# Patient Record
Sex: MALE | Race: WHITE | NOT HISPANIC OR LATINO | Employment: OTHER | URBAN - METROPOLITAN AREA
[De-identification: names, ages, dates, MRNs, and addresses within clinical notes are randomized per-mention and may not be internally consistent; named-entity substitution may affect disease eponyms.]

---

## 2024-03-25 DIAGNOSIS — R26.89 POOR BALANCE: Primary | ICD-10-CM

## 2024-05-14 ENCOUNTER — LAB VISIT (OUTPATIENT)
Dept: LAB | Facility: HOSPITAL | Age: 69
End: 2024-05-14
Attending: PSYCHIATRY & NEUROLOGY
Payer: MEDICARE

## 2024-05-14 ENCOUNTER — OFFICE VISIT (OUTPATIENT)
Dept: NEUROLOGY | Facility: CLINIC | Age: 69
End: 2024-05-14
Payer: MEDICARE

## 2024-05-14 ENCOUNTER — TELEPHONE (OUTPATIENT)
Dept: NEUROLOGY | Facility: CLINIC | Age: 69
End: 2024-05-14

## 2024-05-14 VITALS
WEIGHT: 227.81 LBS | DIASTOLIC BLOOD PRESSURE: 82 MMHG | SYSTOLIC BLOOD PRESSURE: 130 MMHG | HEIGHT: 75 IN | BODY MASS INDEX: 28.32 KG/M2 | HEART RATE: 62 BPM

## 2024-05-14 DIAGNOSIS — M54.9 DORSALGIA, UNSPECIFIED: ICD-10-CM

## 2024-05-14 DIAGNOSIS — R42 DIZZINESS AND GIDDINESS: ICD-10-CM

## 2024-05-14 DIAGNOSIS — R79.9 ABNORMAL FINDING OF BLOOD CHEMISTRY, UNSPECIFIED: ICD-10-CM

## 2024-05-14 DIAGNOSIS — R26.89 POOR BALANCE: Primary | ICD-10-CM

## 2024-05-14 DIAGNOSIS — I67.89 OTHER CEREBROVASCULAR DISEASE: ICD-10-CM

## 2024-05-14 DIAGNOSIS — G60.9 HEREDITARY AND IDIOPATHIC NEUROPATHY, UNSPECIFIED: ICD-10-CM

## 2024-05-14 DIAGNOSIS — G45.1 HEMISPHERIC CAROTID ARTERY SYNDROME: ICD-10-CM

## 2024-05-14 DIAGNOSIS — R26.89 POOR BALANCE: ICD-10-CM

## 2024-05-14 LAB
ALBUMIN SERPL-MCNC: 4 G/DL (ref 3.4–4.8)
ALBUMIN/GLOB SERPL: 1.5 RATIO (ref 1.1–2)
ALP SERPL-CCNC: 62 UNIT/L (ref 40–150)
ALT SERPL-CCNC: 26 UNIT/L (ref 0–55)
AST SERPL-CCNC: 15 UNIT/L (ref 5–34)
BILIRUB SERPL-MCNC: 0.5 MG/DL
BUN SERPL-MCNC: 21.4 MG/DL (ref 8.4–25.7)
CALCIUM SERPL-MCNC: 9.4 MG/DL (ref 8.8–10)
CHLORIDE SERPL-SCNC: 106 MMOL/L (ref 98–107)
CO2 SERPL-SCNC: 29 MMOL/L (ref 23–31)
CREAT SERPL-MCNC: 1.51 MG/DL (ref 0.73–1.18)
EST. AVERAGE GLUCOSE BLD GHB EST-MCNC: 111.2 MG/DL
GFR SERPLBLD CREATININE-BSD FMLA CKD-EPI: 51 ML/MIN/1.73/M2
GLOBULIN SER-MCNC: 2.6 GM/DL (ref 2.4–3.5)
GLUCOSE SERPL-MCNC: 107 MG/DL (ref 82–115)
HBA1C MFR BLD: 5.5 %
IGA SERPL-MCNC: 154 MG/DL (ref 101–645)
IGG SERPL-MCNC: 730 MG/DL (ref 540–1822)
IGM SERPL-MCNC: 66 MG/DL (ref 22–240)
POTASSIUM SERPL-SCNC: 4.6 MMOL/L (ref 3.5–5.1)
PROT SERPL-MCNC: 6.6 GM/DL (ref 5.8–7.6)
SODIUM SERPL-SCNC: 143 MMOL/L (ref 136–145)
TSH SERPL-ACNC: 0.89 UIU/ML (ref 0.35–4.94)
VIT B12 SERPL-MCNC: 341 PG/ML (ref 213–816)

## 2024-05-14 PROCEDURE — 80053 COMPREHEN METABOLIC PANEL: CPT

## 2024-05-14 PROCEDURE — 83036 HEMOGLOBIN GLYCOSYLATED A1C: CPT

## 2024-05-14 PROCEDURE — 82607 VITAMIN B-12: CPT

## 2024-05-14 PROCEDURE — 84443 ASSAY THYROID STIM HORMONE: CPT

## 2024-05-14 PROCEDURE — 86366 MUSCLE-SPECIFIC KINASE ANTB: CPT

## 2024-05-14 PROCEDURE — 36415 COLL VENOUS BLD VENIPUNCTURE: CPT

## 2024-05-14 PROCEDURE — 99205 OFFICE O/P NEW HI 60 MIN: CPT | Mod: S$PBB,,, | Performed by: PSYCHIATRY & NEUROLOGY

## 2024-05-14 PROCEDURE — 84165 PROTEIN E-PHORESIS SERUM: CPT

## 2024-05-14 PROCEDURE — 83519 RIA NONANTIBODY: CPT

## 2024-05-14 PROCEDURE — 99215 OFFICE O/P EST HI 40 MIN: CPT | Mod: PBBFAC | Performed by: PSYCHIATRY & NEUROLOGY

## 2024-05-14 PROCEDURE — 83521 IG LIGHT CHAINS FREE EACH: CPT

## 2024-05-14 PROCEDURE — 82784 ASSAY IGA/IGD/IGG/IGM EACH: CPT

## 2024-05-14 PROCEDURE — 99999 PR PBB SHADOW E&M-EST. PATIENT-LVL V: CPT | Mod: PBBFAC,,, | Performed by: PSYCHIATRY & NEUROLOGY

## 2024-05-14 RX ORDER — BUPROPION HYDROCHLORIDE 300 MG/1
300 TABLET ORAL DAILY
COMMUNITY

## 2024-05-14 RX ORDER — ERGOCALCIFEROL 1.25 MG/1
50000 CAPSULE ORAL
COMMUNITY
Start: 2024-04-10

## 2024-05-14 RX ORDER — FAMOTIDINE 40 MG/1
40 TABLET, FILM COATED ORAL NIGHTLY PRN
COMMUNITY

## 2024-05-14 RX ORDER — VERAPAMIL HYDROCHLORIDE 240 MG/1
240 TABLET, FILM COATED, EXTENDED RELEASE ORAL DAILY
COMMUNITY

## 2024-05-14 RX ORDER — METOPROLOL SUCCINATE 50 MG/1
1 TABLET, EXTENDED RELEASE ORAL DAILY
COMMUNITY

## 2024-05-14 RX ORDER — PANTOPRAZOLE SODIUM 40 MG/1
40 TABLET, DELAYED RELEASE ORAL DAILY
COMMUNITY

## 2024-05-14 RX ORDER — TRAZODONE HYDROCHLORIDE 150 MG/1
150 TABLET ORAL NIGHTLY
COMMUNITY

## 2024-05-14 RX ORDER — FENOFIBRATE 54 MG/1
1 TABLET ORAL DAILY
COMMUNITY

## 2024-05-14 RX ORDER — GABAPENTIN 600 MG/1
600 TABLET ORAL DAILY
COMMUNITY

## 2024-05-14 RX ORDER — LOVASTATIN 20 MG/1
20 TABLET ORAL NIGHTLY
COMMUNITY
End: 2024-06-03

## 2024-05-14 NOTE — TELEPHONE ENCOUNTER
----- Message from Saritha Tejada MD sent at 5/14/2024 11:21 AM CDT -----  Based on his current creatinine he cannot get the CTA we discussed, I will change to MRA

## 2024-05-14 NOTE — PROGRESS NOTES
Chief Complaint   Patient presents with    Balance     NP: Pt referred by Dr. Sukhjinder Medina for neuro consult to evaluate for balance; Pt states balance difficulty 3/2024 weakness to right leg had labs drawn by PCP showing some dehydration denies episode since         This is a 68 y.o. male  here for episodic right lower extremity weakness.  Patient reports symptoms occurred in March.  He recalls having acute onset of right lower extremity weakness that lasted for about 10 hours and then went away.  He had difficulty walking.  The episode occurred again a few days later while shopping at the store.  Had to hold on to a grocery cart while he was walking.  Again only lasted for a few hours and then went away.  He denied any weakness of his arms or drooping of his face or slurring of his words.  Denies any numbness.  Only weakness.  He has had intermittent lower back pain with sciatica on the left side in the past but no severe pain.  He denies any diplopia, dysarthria, dysphagia.  Denies headaches.  Denies vision changes.  Denies loss of consciousness or impairment of consciousness.  Did have a workup including labs which were drawn by his primary care physician which showed some dehydration.    MRI brain 04/15/2024 showed no acute intracranial abnormality, age-related volume loss with moderate chronic microvascular disease, chronic lacunar infarcts in jessy and left external capsule.        Medication List with Changes/Refills   Current Medications    BUPROPION (WELLBUTRIN XL) 300 MG 24 HR TABLET    Take 300 mg by mouth once daily.    ERGOCALCIFEROL (ERGOCALCIFEROL) 50,000 UNIT CAP    Take 50,000 Units by mouth every 7 days.    FAMOTIDINE (PEPCID) 40 MG TABLET    Take 40 mg by mouth nightly as needed.    FENOFIBRATE (TRICOR) 54 MG TABLET    Take 1 tablet by mouth once daily.    GABAPENTIN (NEURONTIN) 600 MG TABLET    Take 600 mg by mouth once daily.    LOVASTATIN (MEVACOR) 20 MG TABLET    Take 20 mg by mouth every  evening.    METOPROLOL SUCCINATE (TOPROL-XL) 50 MG 24 HR TABLET    Take 1 tablet by mouth once daily.    PANTOPRAZOLE (PROTONIX) 40 MG TABLET    Take 40 mg by mouth once daily.    TRAZODONE (DESYREL) 150 MG TABLET    Take 150 mg by mouth nightly.    VERAPAMIL (CALAN-SR) 240 MG CR TABLET    Take 240 mg by mouth once daily.        Past Surgical History:   Procedure Laterality Date    NASAL SEPTUM SURGERY      TONSILLECTOMY          Past Medical History:   Diagnosis Date    Essential (primary) hypertension     High cholesterol         Family History   Problem Relation Name Age of Onset    Cancer Father      Cancer Sister          Social History     Socioeconomic History    Marital status:    Tobacco Use    Smoking status: Never     Passive exposure: Never    Smokeless tobacco: Never   Substance and Sexual Activity    Alcohol use: Not Currently          Review of Systems  Review of Systems   Constitutional: Negative for appetite change.   HENT: Negative for sinus pressure and sore throat.    Eyes: Negative for visual disturbance.   Respiratory: Negative for cough and shortness of breath.    Cardiovascular: Negative for chest pain.   Gastrointestinal: Negative for diarrhea and nausea.   Endocrine: Negative for cold intolerance and heat intolerance.   Genitourinary: Negative for dysuria.   Musculoskeletal: Negative for arthralgias and myalgias.   Skin: Negative for rash.   Allergic/Immunologic: Negative for immunocompromised state.   Neurological:        See HPI   Hematological: Does not bruise/bleed easily.   Psychiatric/Behavioral: Negative for hallucinations.      General: alert and oriented, no acute distress, no audible wheezes, pulse intact, no edema    Vitals:    05/14/24 0903   BP: 130/82   Pulse: 62        Cognition and Comprehension  Speech and language intact  Follows commands  Speech fluent  Attention intact  Memory for recent events intact from history taking  Affect pleasant  Fund of knowledge  adequate    Cranial nerves  II. Optic: Visual fields full to confrontation both eyes  III, IV, VI. Oculomotor: Intact, Pupils equal, round and reactive to light, no nystagmus  V. Trigeminal: sensation to light touch normal  VII. No facial asymmetry or facial weakness  VIII. Hearing intact to spoken voice  IX/X. Glossopharyngeal/Vagus: Voice normal, palate rises symmetrically  XI. Axillary: Shoulder shrug normal  XII. Hypoglossal: Intact    Muscle Strength and Tone  Normal upper extremity tone  Normal lower extremity tone  Normal upper extremity strength  Normal lower extremity strength    Sensation  Decreased sensation to pinprick and vibration in a gradient distribution  in BLE    Reflexes  Normal and symmetric    Coordination and Gait  Finger to nose normal  Gait normal       Deangelo was seen today for balance.    Diagnoses and all orders for this visit:    Poor balance  -     Ambulatory referral/consult to Neurology  -     Comprehensive Metabolic Panel; Future  -     Immunofixation & Protein Electrophoresis & Immunoglobulins; Future  -     Myasthenia Gravis Eval With Musk Reflex; Future  -     Hemoglobin A1C; Future  -     Vitamin B12; Future  -     TSH; Future    Abnormal finding of blood chemistry, unspecified  -     Hemoglobin A1C; Future    Hereditary and idiopathic neuropathy, unspecified  -     TSH; Future    Dizziness and giddiness  -     CTA Head; Future  -     CTA Head    Dorsalgia, unspecified  -     MRI Lumbar Spine Without Contrast; Future  -     MRI Lumbar Spine Without Contrast     Neurologic exam shows no fixed weakness or abnormal reflexes.  Given episodic nature of event, TIA is possible.  Other clinical considerations include a lumbar spinal stenosis worse with certain positioning.  Plan CTA, MRI lumbar spine, neuropathy labs.

## 2024-05-14 NOTE — TELEPHONE ENCOUNTER
Pt informed based on current creatinine unable to get the CTA will need to change to MRA Pt verbalized understanding

## 2024-05-15 LAB
ALBUMIN % SPEP (OHS): 54.25
ALBUMIN SERPL-MCNC: 3.5 G/DL (ref 3.4–4.8)
ALBUMIN/GLOB SERPL: 1.2 RATIO (ref 1.1–2)
ALPHA 1 GLOB (OHS): 0.25 GM/DL
ALPHA 1 GLOB% (OHS): 3.85
ALPHA 2 GLOB % (OHS): 11.95
ALPHA 2 GLOB (OHS): 0.78 GM/DL
BETA GLOB (OHS): 1.15 GM/DL
BETA GLOB% (OHS): 17.69
GAMMA GLOBULIN % (OHS): 12.26
GAMMA GLOBULIN (OHS): 0.8 GM/DL
GLOBULIN SER-MCNC: 3 GM/DL (ref 2.4–3.5)
KAPPA LC FREE SER-MCNC: 1.33 MG/DL (ref 0.33–1.94)
KAPPA LC FREE/LAMBDA FREE SER: 1.22 {RATIO} (ref 0.26–1.65)
LAMBDA LC FREE SERPL-MCNC: 1.09 MG/DL (ref 0.57–2.63)
M SPIKE % (OHS): NORMAL
M SPIKE (OHS): NORMAL
PATH REV: NORMAL
PROT SERPL-MCNC: 6.5 GM/DL (ref 5.8–7.6)

## 2024-05-20 ENCOUNTER — TELEPHONE (OUTPATIENT)
Dept: NEUROLOGY | Facility: CLINIC | Age: 69
End: 2024-05-20
Payer: MEDICARE

## 2024-05-20 DIAGNOSIS — I67.2 INTRACRANIAL ATHEROSCLEROSIS: ICD-10-CM

## 2024-05-20 DIAGNOSIS — I67.89 OTHER CEREBROVASCULAR DISEASE: Primary | ICD-10-CM

## 2024-05-20 LAB — MUSK AB SER-SCNC: 0 NMOL/L (ref 0–0.02)

## 2024-05-20 NOTE — PROGRESS NOTES
His CTA shows atherosclerosis in the intracranial arteries. I would like him to go ahead and seee our neurovascular specialist for this Dr. Vora. Will send referral, MRI L spine shows some disc bulges and arthritis but nothing that would warrant surgery at this time

## 2024-05-20 NOTE — TELEPHONE ENCOUNTER
CTA brain and MRI L - spine given to patient. Informed of referral to Dr. Vora. If he does not receive a call from them by mid week to contact the office.

## 2024-05-20 NOTE — TELEPHONE ENCOUNTER
----- Message from Saritha Tejada MD sent at 5/20/2024  3:32 PM CDT -----  His CTA shows atherosclerosis in the intracranial arteries. I would like him to go ahead and seee our neurovascular specialist for this Dr. Vora. Will send referral, MRI L spine shows some disc bulges and arthritis but nothing that would warrant surgery at this time

## 2024-05-21 LAB
ACHR BIND AB SER-SCNC: 0 NMOL/L
IMMUNOLOGIST REVIEW: NORMAL

## 2024-05-22 ENCOUNTER — TELEPHONE (OUTPATIENT)
Dept: NEUROLOGY | Facility: CLINIC | Age: 69
End: 2024-05-22
Payer: MEDICARE

## 2024-05-22 NOTE — TELEPHONE ENCOUNTER
----- Message from Saritha Tejada MD sent at 5/22/2024  1:49 PM CDT -----  MRA shows intracranial atherosclerosis as well. Does he have appt schedule with Diony

## 2024-05-23 ENCOUNTER — TELEPHONE (OUTPATIENT)
Dept: NEUROLOGY | Facility: CLINIC | Age: 69
End: 2024-05-23
Payer: MEDICARE

## 2024-06-03 ENCOUNTER — OFFICE VISIT (OUTPATIENT)
Dept: NEUROLOGY | Facility: CLINIC | Age: 69
End: 2024-06-03
Payer: MEDICARE

## 2024-06-03 VITALS
HEIGHT: 75 IN | BODY MASS INDEX: 28.32 KG/M2 | SYSTOLIC BLOOD PRESSURE: 155 MMHG | HEART RATE: 57 BPM | WEIGHT: 227.75 LBS | DIASTOLIC BLOOD PRESSURE: 78 MMHG

## 2024-06-03 DIAGNOSIS — G45.9 TIA (TRANSIENT ISCHEMIC ATTACK): Primary | ICD-10-CM

## 2024-06-03 DIAGNOSIS — I67.2 INTRACRANIAL ATHEROSCLEROSIS: ICD-10-CM

## 2024-06-03 PROCEDURE — 99213 OFFICE O/P EST LOW 20 MIN: CPT | Mod: PBBFAC | Performed by: PSYCHIATRY & NEUROLOGY

## 2024-06-03 PROCEDURE — 99214 OFFICE O/P EST MOD 30 MIN: CPT | Mod: S$PBB,,, | Performed by: PSYCHIATRY & NEUROLOGY

## 2024-06-03 PROCEDURE — 99999 PR PBB SHADOW E&M-EST. PATIENT-LVL III: CPT | Mod: PBBFAC,,, | Performed by: PSYCHIATRY & NEUROLOGY

## 2024-06-03 RX ORDER — ATORVASTATIN CALCIUM 80 MG/1
80 TABLET, FILM COATED ORAL DAILY
Qty: 90 TABLET | Refills: 3 | Status: SHIPPED | OUTPATIENT
Start: 2024-06-03 | End: 2025-06-03

## 2024-06-03 RX ORDER — ASPIRIN 81 MG/1
81 TABLET ORAL DAILY
Qty: 30 TABLET | Refills: 6 | Status: SHIPPED | OUTPATIENT
Start: 2024-06-03 | End: 2025-06-03

## 2024-06-03 RX ORDER — CLOPIDOGREL BISULFATE 75 MG/1
75 TABLET ORAL DAILY
Qty: 30 TABLET | Refills: 11 | Status: SHIPPED | OUTPATIENT
Start: 2024-06-03 | End: 2025-06-03

## 2024-06-03 NOTE — PROGRESS NOTES
Neurology Office Visit  Neurology  HPI:    Deangelo Mcdermott is a 68 y.o. male with a history of HTN, HLD is referred to the stroke clinic by Dr Tejada due to concern for TIA. He reports 3 episodes of transient right leg weakness/numbness lasting several minutes. He states that since last November, he had 3 episodes where he developed right leg weakness/numbness while he was ambulating. He reported that his symptoms resolved after he laid down for a few hours. Denies any other associated symptoms. He had MRI brain in April which did not show any acute infarct but showed multiple smaller areas of encephalomalacia in bilateral frontal lobe, jessy, left external capsule. He underwent MRA which demonstrated severe stenosis of left cavernous ICA, left A1, left PCA, moderate stenosis of right A2, severe stenosis of right A3. These findings were concerning for intracranial atherosclerosis. Patient denies any history of stroke and denies taking any antiplatelet medications. He is currently on lovastatin and anti-HTN.     ROS:  Review of Systems   Constitutional:  Negative for malaise/fatigue.   HENT:  Negative for nosebleeds.    Eyes:  Negative for blurred vision and double vision.   Respiratory:  Negative for hemoptysis.    Gastrointestinal:  Negative for blood in stool and melena.   Genitourinary:  Negative for hematuria.   Neurological:  Positive for dizziness, sensory change and focal weakness. Negative for speech change, seizures, weakness and headaches.   Endo/Heme/Allergies:  Does not bruise/bleed easily.        Past Medical History:   Diagnosis Date    Essential (primary) hypertension     High cholesterol      Past Surgical History:   Procedure Laterality Date    NASAL SEPTUM SURGERY      TONSILLECTOMY       Family History   Problem Relation Name Age of Onset    Cancer Father      Cancer Sister       Review of patient's allergies indicates:   Allergen Reactions    Penicillins        Current Outpatient Medications:      buPROPion (WELLBUTRIN XL) 300 MG 24 hr tablet, Take 300 mg by mouth once daily., Disp: , Rfl:     ergocalciferol (ERGOCALCIFEROL) 50,000 unit Cap, Take 50,000 Units by mouth every 7 days., Disp: , Rfl:     famotidine (PEPCID) 40 MG tablet, Take 40 mg by mouth nightly as needed., Disp: , Rfl:     fenofibrate (TRICOR) 54 MG tablet, Take 1 tablet by mouth once daily., Disp: , Rfl:     gabapentin (NEURONTIN) 600 MG tablet, Take 600 mg by mouth once daily., Disp: , Rfl:     lovastatin (MEVACOR) 20 MG tablet, Take 20 mg by mouth every evening., Disp: , Rfl:     metoprolol succinate (TOPROL-XL) 50 MG 24 hr tablet, Take 1 tablet by mouth once daily., Disp: , Rfl:     pantoprazole (PROTONIX) 40 MG tablet, Take 40 mg by mouth once daily., Disp: , Rfl:     traZODone (DESYREL) 150 MG tablet, Take 150 mg by mouth nightly., Disp: , Rfl:     verapamiL (CALAN-SR) 240 MG CR tablet, Take 240 mg by mouth once daily., Disp: , Rfl:   Outpatient Medications Marked as Taking for the 6/3/24 encounter (Office Visit) with Brad Vora MD   Medication Sig Dispense Refill    buPROPion (WELLBUTRIN XL) 300 MG 24 hr tablet Take 300 mg by mouth once daily.      ergocalciferol (ERGOCALCIFEROL) 50,000 unit Cap Take 50,000 Units by mouth every 7 days.      famotidine (PEPCID) 40 MG tablet Take 40 mg by mouth nightly as needed.      fenofibrate (TRICOR) 54 MG tablet Take 1 tablet by mouth once daily.      gabapentin (NEURONTIN) 600 MG tablet Take 600 mg by mouth once daily.      lovastatin (MEVACOR) 20 MG tablet Take 20 mg by mouth every evening.      metoprolol succinate (TOPROL-XL) 50 MG 24 hr tablet Take 1 tablet by mouth once daily.      pantoprazole (PROTONIX) 40 MG tablet Take 40 mg by mouth once daily.      traZODone (DESYREL) 150 MG tablet Take 150 mg by mouth nightly.      verapamiL (CALAN-SR) 240 MG CR tablet Take 240 mg by mouth once daily.       Social History     Tobacco Use    Smoking status: Never     Passive exposure: Never     Smokeless tobacco: Never   Substance Use Topics    Alcohol use: Not Currently         Vitals:    06/03/24 1432   BP: (!) 155/78   Pulse: (!) 57       Physical Exam:  HEENT NC/AT  CV RRR, no tenderness  Resp clear without dyspnea  GI soft  Ext no edema    Neuro:  Alert & oriented x 3  EOMI, PERRLA, visual field intact in all 4 quadrants, no nystagmus or diplopia on lateral gaze  Face symmetric, speech clear and fluent, facial sensation equal bilaterally  Tongue midline  Strength 5/5 in bilateral upper and lower extremities   Sensation intact and equal bilaterally  Gait steady and balanced     mRS 1    Imaging:  Reviewed MRA head report and MRI brain  - severe stenosis of left ICA (cavernous), left A1, left PCA, moderate stenosis of right A2, severe stenosis of right A3  Scattered encephalomalacia in bilateral frontal lobes    Assessment:   Deangelo Mcdermott is a 68 y.o. male with a history of HTN, HLD is referred to the stroke clinic by Dr Tejada due to concern for TIA. He reports 3 episodes of transient right leg weakness/numbness lasting several minutes. His imaging was concerning for multifocal stenosis in intracranial branches concerning for intracranial atherosclerosis. He is currently asymptomatic and exam is non focal.     I reviewed the imaging report with the patient and explained that he likely has intracranial atherosclerosis as the etiology of his symptoms. I discussed utility of a diagnostic cerebral angiogram to further evaluate the intracranial blood flow. I also discussed about starting antiplatelet medications for stroke prevention and maximizing medical therapy for ICAD.     Plan:   - start aspirin 81mg, plavix 75 mg  - start lipitor 80 mg, discontinue lovastatin  - diagnostic cerebral angiogram  - Based on AHA/ACC 2021 guidelines, patient's primary care doctor should target BP goal of <130/80 mm?Hg, LDL-C <70 mg/dL and HbA1c of <7% to minimize the risk of future strokes.  - follow up with Sara  IONA Jacinto in 3 months    Brad Vora MD  Vascular and Interventional Neurology

## 2024-06-04 ENCOUNTER — TELEPHONE (OUTPATIENT)
Dept: NEUROLOGY | Facility: CLINIC | Age: 69
End: 2024-06-04
Payer: MEDICARE

## 2024-06-04 NOTE — TELEPHONE ENCOUNTER
Spoke with patient regarding Diagnostic Cerebral Angiogram. Patient is now scheduled on June 10th. Advised patient that a nurse from [facility] will call the Friday before with pre-op instructions. Patient verbalized understanding.

## 2024-06-10 ENCOUNTER — HOSPITAL ENCOUNTER (OUTPATIENT)
Dept: INTERVENTIONAL RADIOLOGY/VASCULAR | Facility: HOSPITAL | Age: 69
Discharge: HOME | End: 2024-06-10
Attending: PSYCHIATRY & NEUROLOGY
Payer: MEDICARE

## 2024-06-10 VITALS
DIASTOLIC BLOOD PRESSURE: 73 MMHG | TEMPERATURE: 98 F | WEIGHT: 225.75 LBS | HEART RATE: 52 BPM | OXYGEN SATURATION: 100 % | BODY MASS INDEX: 28.07 KG/M2 | SYSTOLIC BLOOD PRESSURE: 163 MMHG | HEIGHT: 75 IN

## 2024-06-10 DIAGNOSIS — G45.9 TIA (TRANSIENT ISCHEMIC ATTACK): ICD-10-CM

## 2024-06-10 DIAGNOSIS — I67.2 INTRACRANIAL ATHEROSCLEROSIS: ICD-10-CM

## 2024-06-10 DIAGNOSIS — R07.9 CHEST PAIN: ICD-10-CM

## 2024-06-10 LAB
ANION GAP SERPL CALC-SCNC: 4 MEQ/L
BASOPHILS # BLD AUTO: 0.08 X10(3)/MCL
BASOPHILS NFR BLD AUTO: 1.4 %
BUN SERPL-MCNC: 21.6 MG/DL (ref 8.4–25.7)
CALCIUM SERPL-MCNC: 9.3 MG/DL (ref 8.8–10)
CHLORIDE SERPL-SCNC: 109 MMOL/L (ref 98–107)
CO2 SERPL-SCNC: 29 MMOL/L (ref 23–31)
CREAT SERPL-MCNC: 1.67 MG/DL (ref 0.73–1.18)
CREAT/UREA NIT SERPL: 13
EOSINOPHIL # BLD AUTO: 0.35 X10(3)/MCL (ref 0–0.9)
EOSINOPHIL NFR BLD AUTO: 6 %
ERYTHROCYTE [DISTWIDTH] IN BLOOD BY AUTOMATED COUNT: 12.5 % (ref 11.5–17)
GFR SERPLBLD CREATININE-BSD FMLA CKD-EPI: 45 ML/MIN/1.73/M2
GLUCOSE SERPL-MCNC: 103 MG/DL (ref 82–115)
HCT VFR BLD AUTO: 38.7 % (ref 42–52)
HGB BLD-MCNC: 12.4 G/DL (ref 14–18)
IMM GRANULOCYTES # BLD AUTO: 0.01 X10(3)/MCL (ref 0–0.04)
IMM GRANULOCYTES NFR BLD AUTO: 0.2 %
INR PPP: 1
LYMPHOCYTES # BLD AUTO: 1.35 X10(3)/MCL (ref 0.6–4.6)
LYMPHOCYTES NFR BLD AUTO: 23 %
MCH RBC QN AUTO: 29.5 PG (ref 27–31)
MCHC RBC AUTO-ENTMCNC: 32 G/DL (ref 33–36)
MCV RBC AUTO: 92.1 FL (ref 80–94)
MONOCYTES # BLD AUTO: 0.65 X10(3)/MCL (ref 0.1–1.3)
MONOCYTES NFR BLD AUTO: 11.1 %
NEUTROPHILS # BLD AUTO: 3.42 X10(3)/MCL (ref 2.1–9.2)
NEUTROPHILS NFR BLD AUTO: 58.3 %
NRBC BLD AUTO-RTO: 0 %
PLATELET # BLD AUTO: 293 X10(3)/MCL (ref 130–400)
PMV BLD AUTO: 9.1 FL (ref 7.4–10.4)
POTASSIUM SERPL-SCNC: 4.4 MMOL/L (ref 3.5–5.1)
PROTHROMBIN TIME: 12.7 SECONDS (ref 12.5–14.5)
RBC # BLD AUTO: 4.2 X10(6)/MCL (ref 4.7–6.1)
SODIUM SERPL-SCNC: 142 MMOL/L (ref 136–145)
WBC # SPEC AUTO: 5.86 X10(3)/MCL (ref 4.5–11.5)

## 2024-06-10 PROCEDURE — 85610 PROTHROMBIN TIME: CPT | Performed by: PSYCHIATRY & NEUROLOGY

## 2024-06-10 PROCEDURE — 36415 COLL VENOUS BLD VENIPUNCTURE: CPT | Performed by: PSYCHIATRY & NEUROLOGY

## 2024-06-10 PROCEDURE — 80048 BASIC METABOLIC PNL TOTAL CA: CPT | Performed by: PSYCHIATRY & NEUROLOGY

## 2024-06-10 PROCEDURE — 85025 COMPLETE CBC W/AUTO DIFF WBC: CPT | Performed by: PSYCHIATRY & NEUROLOGY

## 2024-06-10 PROCEDURE — 36224 PLACE CATH CAROTD ART: CPT | Mod: 50

## 2024-06-10 PROCEDURE — 63600175 PHARM REV CODE 636 W HCPCS: Performed by: PSYCHIATRY & NEUROLOGY

## 2024-06-10 PROCEDURE — 25500020 PHARM REV CODE 255: Performed by: PSYCHIATRY & NEUROLOGY

## 2024-06-10 PROCEDURE — 25000003 PHARM REV CODE 250: Performed by: PSYCHIATRY & NEUROLOGY

## 2024-06-10 RX ORDER — HYDRALAZINE HYDROCHLORIDE 20 MG/ML
10 INJECTION INTRAMUSCULAR; INTRAVENOUS EVERY 4 HOURS PRN
Status: DISCONTINUED | OUTPATIENT
Start: 2024-06-10 | End: 2024-06-11 | Stop reason: HOSPADM

## 2024-06-10 RX ORDER — ONDANSETRON HYDROCHLORIDE 2 MG/ML
4 INJECTION, SOLUTION INTRAVENOUS EVERY 8 HOURS PRN
Status: DISCONTINUED | OUTPATIENT
Start: 2024-06-10 | End: 2024-06-11 | Stop reason: HOSPADM

## 2024-06-10 RX ORDER — HEPARIN SODIUM 1000 [USP'U]/ML
INJECTION, SOLUTION INTRAVENOUS; SUBCUTANEOUS
Status: COMPLETED | OUTPATIENT
Start: 2024-06-10 | End: 2024-06-10

## 2024-06-10 RX ORDER — SODIUM CHLORIDE 9 MG/ML
INJECTION, SOLUTION INTRAVENOUS CONTINUOUS
Status: ACTIVE | OUTPATIENT
Start: 2024-06-10 | End: 2024-06-10

## 2024-06-10 RX ORDER — ACETAMINOPHEN 325 MG/1
650 TABLET ORAL EVERY 4 HOURS PRN
Status: DISCONTINUED | OUTPATIENT
Start: 2024-06-10 | End: 2024-06-11 | Stop reason: HOSPADM

## 2024-06-10 RX ORDER — LIDOCAINE HYDROCHLORIDE 20 MG/ML
INJECTION, SOLUTION INFILTRATION; PERINEURAL
Status: COMPLETED | OUTPATIENT
Start: 2024-06-10 | End: 2024-06-10

## 2024-06-10 RX ORDER — FENTANYL CITRATE 50 UG/ML
INJECTION, SOLUTION INTRAMUSCULAR; INTRAVENOUS
Status: COMPLETED | OUTPATIENT
Start: 2024-06-10 | End: 2024-06-10

## 2024-06-10 RX ORDER — SODIUM CHLORIDE 9 MG/ML
INJECTION, SOLUTION INTRAVENOUS CONTINUOUS
Status: DISCONTINUED | OUTPATIENT
Start: 2024-06-10 | End: 2024-06-11 | Stop reason: HOSPADM

## 2024-06-10 RX ORDER — MIDAZOLAM HYDROCHLORIDE 2 MG/2ML
INJECTION, SOLUTION INTRAMUSCULAR; INTRAVENOUS
Status: COMPLETED | OUTPATIENT
Start: 2024-06-10 | End: 2024-06-10

## 2024-06-10 RX ORDER — NITROGLYCERIN 5 MG/ML
INJECTION, SOLUTION INTRAVENOUS
Status: COMPLETED | OUTPATIENT
Start: 2024-06-10 | End: 2024-06-10

## 2024-06-10 RX ORDER — VERAPAMIL HYDROCHLORIDE 2.5 MG/ML
INJECTION, SOLUTION INTRAVENOUS
Status: COMPLETED | OUTPATIENT
Start: 2024-06-10 | End: 2024-06-10

## 2024-06-10 RX ADMIN — FENTANYL CITRATE 50 MCG: 50 INJECTION INTRAMUSCULAR; INTRAVENOUS at 10:06

## 2024-06-10 RX ADMIN — LIDOCAINE HYDROCHLORIDE 5 ML: 20 INJECTION, SOLUTION INFILTRATION; PERINEURAL at 10:06

## 2024-06-10 RX ADMIN — SODIUM CHLORIDE: 9 INJECTION, SOLUTION INTRAVENOUS at 09:06

## 2024-06-10 RX ADMIN — MIDAZOLAM HYDROCHLORIDE 1 MG: 1 INJECTION, SOLUTION INTRAMUSCULAR; INTRAVENOUS at 10:06

## 2024-06-10 RX ADMIN — HEPARIN SODIUM 3500 UNITS: 1000 INJECTION, SOLUTION INTRAVENOUS; SUBCUTANEOUS at 10:06

## 2024-06-10 RX ADMIN — VERAPAMIL HYDROCHLORIDE 2.5 MG: 2.5 INJECTION, SOLUTION INTRAVENOUS at 10:06

## 2024-06-10 RX ADMIN — NITROGLYCERIN 200 MCG: 5 INJECTION, SOLUTION INTRAVENOUS at 10:06

## 2024-06-10 RX ADMIN — IOPAMIDOL 100 ML: 755 INJECTION, SOLUTION INTRAVENOUS at 11:06

## 2024-06-10 NOTE — H&P
Pre-Operative History and Physical   Interventional Neurology    Deangelo Mcdermott is a 68 y.o. male who was recently seen in clinic on 6/3 for recurrent RLE weakness presents for a diagnostic cerebral angiogram for evaluation of left A1 stenosis. Since being seen in clinic he has had 2-3 episodes of transient bilateral LE weakness. He was started on plavix on clinic visit and took his first dose on Tuesday last week. He also has history of HTN, HLD and is on anti-HTN. Denies any other symptoms other than transient leg weakness.     ROS:  ROS as described in HPI    Past Medical History:   Diagnosis Date    Essential (primary) hypertension     High cholesterol      Past Surgical History:   Procedure Laterality Date    NASAL SEPTUM SURGERY      TONSILLECTOMY       Family History   Problem Relation Name Age of Onset    Cancer Father      Cancer Sister       Review of patient's allergies indicates:   Allergen Reactions    Penicillins        Current Outpatient Medications:     aspirin (ECOTRIN) 81 MG EC tablet, Take 1 tablet (81 mg total) by mouth once daily., Disp: 30 tablet, Rfl: 6    atorvastatin (LIPITOR) 80 MG tablet, Take 1 tablet (80 mg total) by mouth once daily., Disp: 90 tablet, Rfl: 3    buPROPion (WELLBUTRIN XL) 300 MG 24 hr tablet, Take 300 mg by mouth once daily., Disp: , Rfl:     clopidogreL (PLAVIX) 75 mg tablet, Take 1 tablet (75 mg total) by mouth once daily., Disp: 30 tablet, Rfl: 11    ergocalciferol (ERGOCALCIFEROL) 50,000 unit Cap, Take 50,000 Units by mouth every 7 days., Disp: , Rfl:     famotidine (PEPCID) 40 MG tablet, Take 40 mg by mouth nightly as needed., Disp: , Rfl:     fenofibrate (TRICOR) 54 MG tablet, Take 1 tablet by mouth once daily., Disp: , Rfl:     gabapentin (NEURONTIN) 600 MG tablet, Take 600 mg by mouth once daily., Disp: , Rfl:     metoprolol succinate (TOPROL-XL) 50 MG 24 hr tablet, Take 1 tablet by mouth once daily., Disp: , Rfl:     pantoprazole (PROTONIX) 40 MG tablet, Take 40  mg by mouth once daily., Disp: , Rfl:     traZODone (DESYREL) 150 MG tablet, Take 150 mg by mouth nightly., Disp: , Rfl:     verapamiL (CALAN-SR) 240 MG CR tablet, Take 240 mg by mouth once daily., Disp: , Rfl:     Current Facility-Administered Medications:     0.9%  NaCl infusion, , Intravenous, Continuous, Brad Vora MD  No outpatient medications have been marked as taking for the 6/10/24 encounter (Hospital Encounter) with [facility].     Social History     Tobacco Use    Smoking status: Never     Passive exposure: Never    Smokeless tobacco: Never   Substance Use Topics    Alcohol use: Not Currently         Vitals:    06/10/24 0824   BP: (!) 143/81   Pulse: 60   Temp: 97.8 °F (36.6 °C)     Gen NAD  HEENT NC/AT  CV RRR,  Resp clear  GI soft  Ext no C/C/E    Neuro  AAOx4  Speech fluent, appropriate  EOMI, PERRLA, VFF  Normal facial strength, sensation  Tongue and palate midline  Motor 5/5  Sensation intact  Coord intact      Assessment: 68 M with HTN, HLD, left A1 stenosis and intracranial atherosclerosis. Presents for diagnostic cerebral angiogram.     Plan:   DSA    I have discussed the risks, benefits, indications, and alternatives of the procedure in detail.  The patient verbalizes understanding.  All questions answered.  Consents signed.  The patient agrees to proceed to proceed as planned.    Brad Vora MD  Vascular and Interventional Neurology

## 2024-06-10 NOTE — OP NOTE
[facility]                       [address] [place] [id number]     PATIENT NAME:Deangelo Mcdermott    YOB: 1955      DATE OF SURGERY: 06/10/2024      SURGEON:  Brad Vora MD     PREOPERATIVE DIAGNOSIS:  concern for left A1 stenosis, intracranial atherosclerosis     POSTOPERATIVE DIAGNOSIS:  diffuse severe intracranial atherosclerosis with severe focal stenosis of right A2     PROCEDURE PERFORMED:    Cerebral angiogram with catheter insertion in the following arteries:  Right common carotid, right internal carotid, right external carotid artery, right subclavian, right vertebral, left common carotid, left internal carotid, left external carotid artery, left subclavian, .  Interpretation of images  Ultrasound-guided right radial artery access  Moderate conscious sedation for 60 minutes     LEVEL OF SEDATION:  Conscious sedation.  Sedation administered by independent   trained observer under attending supervision with continuous monitoring of the   patient's level of consciousness and physiologic status.     TOTAL INTRASERVICE SEDATION TIME:  60 minutes.     COMPLICATIONS:  None.    APPROACH:  Right radial artery      VESSELS SELECTIVELY CATHETERIZED:   Left subclavian artery   Left common carotid artery  Left internal carotid artery   Left external carotid artery   Right common carotid artery   Right internal carotid artery   Right external carotid artery   Right subclavian artery   Right vertebral artery      DEVICES USED:  5 Fr slender sheath  5 Fr Sim 2 catheter  035 glidewire  TR band     INDICATION:  68 y.o. years old male with a history of HTN, HLD presents for a cerebral angiogram for evaluation of left A1 stenosis and intracranial atherosclerosis with episodic right LE weakness.     DETAILED DESCRIPTION:      Risks/benefits were thoroughly reviewed with patient/family prior to the procedure.  Risks inclusive but not  limited to death, stroke, contrast reaction/nephropathy, access/vascular injury, and other unforeseen risks.  Patient/family provided informed consent.  Patient supine on the angio table, wrist prepped and draped in routine fashion.  Moderate conscious sedation was administered along with local anesthesia.    The right radial artery was sonographically evaluated and judged to be patent.  Real-time ultrasound was used to visualize needle entry into the vessel and a permanent image was stored. An arterial sheath was placed over the wire.  Radial cocktail of 2.5mg verapamil, 200 mcg nitroglycerin and 3500 units of heparin were given through the sheath. Diagnostic catheter telescoping over the 035 glidewire advanced to the arch and double flushed.  Enumerated vessels were then selectively catheterized and angiograms obtained as listed below.  Multiple cervical and intracranial runs were obtained in AP and lateral projection.  The films were reviewed and determined to be of good diagnostic quality.  Diagnostic catheter and sheath were then withdrawn and hemostasis was obtained with above closure device.  No immediate complications were noted.  Patient tolerated the procedure well.    Angiograms performed and findings:    Right radial artery:  Sheath placement in the right radial artery.  There is no evidence of stenosis, dissection, atherosclerosis or contrast extravasation at the site of puncture.  Left subclavian artery - cervical:  Normal origin and cervical course of the left vertebral artery.    Left subclavian artery - cerebral:  Left vertebral artery is dominant.  Unremarkable distal cervical and intracranial segments of the left vertebral artery.  Evidence of multifocal irregularity noted in the basilar artery, bilateral posterior cerebral arteries.  Bilateral SCA, AICA visualized.  Left common carotid artery- cervical:  Unremarkable left common carotid artery.  Evidence of mild atherosclerotic plaque of the left  carotid bifurcation resulting in mild stenosis of the left ICA.  Left internal carotid artery - cranial:  Unremarkable extracranial segment of the left internal carotid artery.  Evidence of stenosis in the cavernous segment of the left ICA measuring 55%.   The supraclinoid segment of the left ICA appears patent.  The left anterior cerebral artery appears hypoplastic with focal stenosis of the origin.  There is evidence of multifocal irregularities noted throughout the left MCA territory.  There is flash filling of the left anterior cerebral artery territory likely due to competitive flow from the contralateral anterior cerebral artery.  Unremarkable capillary and venous phase.  Left internal carotid artery - cranial -magnified images demonstrated the focal stenosis of the left CHRISTINA origin with a hypoplastic A1 segment.  The left middle cerebral artery branches appear irregular likely due to underlying atherosclerotic disease.  Left common carotid artery - cervical:  Mild stenosis of the left internal carotid artery origin noted.    Left external carotid artery- cranial:  Unremarkable left external carotid artery and its branches.  No evidence of AV fistula.    Right common carotid artery - cervical:  Unremarkable right common carotid artery.  Evidence of atherosclerotic plaque of the right carotid bifurcation.  There is less than 50% stenosis of the right ICA origin due to underlying atherosclerotic plaque.    Right internal carotid artery and cranial:  Unremarkable extracranial and intracranial segment of the right internal carotid artery.  There is normal branching of right CHRISTINA and MCA noted.  Evidence multifocal irregularity noted throughout the right MCA and CHRISTINA branches.  Right internal carotid artery - cranial- magnified images demonstrated multifocal irregularity and stenosis throughout the right CHRISTINA and MCA branches.  There is severe focal stenosis of the right A2 segment resulting in slow flow through the  right CHRISTINA territory.  There appears pial collaterals from the right MCA territory supplying the right CHRISTINA  Territory.  Right external carotid artery - cranial:  Unremarkable right external carotid artery and its branches.  No evidence of AV fistula.    Right subclavian artery -cervical:  Diminutive right vertebral artery noted.  Right vertebral artery - cranial:  Unremarkable extracranial segment of the right vertebral artery.  The V4 segment appears hypoplastic in the right vertebral artery ends in right PICA.  There was evidence of multifocal irregularity noted in the right PICA.      OVERALL IMPRESSION:  Diffuse intracranial atherosclerosis with multifocal narrowing of anterior and posterior circulation  Severe focal stenosis of right A2-A3 segment        ______________________________  Brad Vora MD  Vascular and Interventional Neurology

## 2024-06-10 NOTE — BRIEF OP NOTE
Name: Deangelo Mcdermott  MRN: [id number]  Age: 68 y.o.  Gender: male    Date of Procedure: 06/10/2024    Pre-operative Diagnosis: Concern for left A1 stenosis and intracranial atherosclerosis    Post-operative Diagnosis: Diffuse intracranial atherosclerotic disease with severe focal stenosis of right A2     Procedure: Diagnostic cerebral angiogram     Access/Closure: Right radial artery access    Surgeon: Brad oVra MD    Anesthesia: LA and conscious sedation    EBL: min    Description of findings:  - diffuse intracranial atherosclerosis  - severe focal stenosis of right A2      Patient condition:   stable    Implant/specimen(s):  none    Plan:  - -160 during recovery  - wrist precautions  - continue aspirin & plavix x 1 year atleast  - continue lipitor 80 mg  - hold anti-HTN x 1 week and check BP at home BID and journal it.   - will review BP next week. Target -160 at home  - medical management of ICAD. If he has recurrent left leg weakness, will discuss right CHRISTINA (A2) angioplasty.     Brad Vora MD  Interventional Neurology

## 2024-06-10 NOTE — DISCHARGE INSTRUCTIONS
REMOVE DRESSING IN 24 HOURS. SHOWER AFTER 24 HOURS. NO TUB BATHS FOR 5 DAYS.  NO LIFTING, PUSHING OR PULLING ANYTHING GREATER THAN 10 POUNDS (A GALLON OF MILK) FOR 5 DAYS  NO DRIVING FOR 48 HOURS  MONITOR SITE FOR SIGNS/SYMPTOMS OF INFECTION (SWELLING, REDNESS, YELLOW/GREEN DRAINAGE, WORSENING PAIN)  NO LOTIONS, CREAMS OR POWDERS ON SITE  MONITOR SITE FOR BLEEDING. IF BLEEDING OCCURS LIE FLAT, HOLD PRESSURE AND CALL 911

## 2024-06-17 ENCOUNTER — OFFICE VISIT (OUTPATIENT)
Dept: NEUROLOGY | Facility: CLINIC | Age: 69
End: 2024-06-17
Payer: MEDICARE

## 2024-06-17 VITALS
HEART RATE: 85 BPM | SYSTOLIC BLOOD PRESSURE: 155 MMHG | BODY MASS INDEX: 28.07 KG/M2 | WEIGHT: 225.75 LBS | DIASTOLIC BLOOD PRESSURE: 88 MMHG | HEIGHT: 75 IN

## 2024-06-17 DIAGNOSIS — G45.9 TIA (TRANSIENT ISCHEMIC ATTACK): ICD-10-CM

## 2024-06-17 DIAGNOSIS — I67.2 INTRACRANIAL ATHEROSCLEROSIS: Primary | ICD-10-CM

## 2024-06-17 PROCEDURE — 99213 OFFICE O/P EST LOW 20 MIN: CPT | Mod: S$PBB,,, | Performed by: NURSE PRACTITIONER

## 2024-06-17 PROCEDURE — 99213 OFFICE O/P EST LOW 20 MIN: CPT | Mod: PBBFAC | Performed by: NURSE PRACTITIONER

## 2024-06-17 PROCEDURE — 99999 PR PBB SHADOW E&M-EST. PATIENT-LVL III: CPT | Mod: PBBFAC,,, | Performed by: NURSE PRACTITIONER

## 2024-06-17 NOTE — PROGRESS NOTES
Subjective:      Patient ID: Deangelo Mcdermott is a 68 y.o. male.    Chief Complaint:  Transient Ischemic Attack (1 week f/u post Angiogram. Patient denies any swelling, drainge or pain at site. Patient c/o weakness of both legs that is intermittent. C/o HA since being off of BP. )      History of Present Illness  Patient presents for 1 week follow up after cerebral angiogram. He has a history of HTN, HLD, TIAs. Patient's cerebral angiogram revealed severe intracranial atherosclerosis with multifocal severe stenosis. Dr. Vora asked patient to hold anti-hypertensives for a week and record his BP. Patient forgot his log today. Patient states that since stopping his BP medications, he has had headaches almost daily. Did take a Toprol XR one day last week because his sBP had gotten to 173 and he was experiencing a severe headache. Reports that after he took Toprol, his headache improved. Does report since starting ASA/Plavix, his episodes of weakness have decreased in length they are occurring.           Past Medical History:   Diagnosis Date    Essential (primary) hypertension     High cholesterol        Past Surgical History:   Procedure Laterality Date    NASAL SEPTUM SURGERY      TONSILLECTOMY         Family History   Problem Relation Name Age of Onset    Cancer Father      Cancer Sister         Social History     Socioeconomic History    Marital status:    Tobacco Use    Smoking status: Never     Passive exposure: Never    Smokeless tobacco: Never   Substance and Sexual Activity    Alcohol use: Not Currently       Current Outpatient Medications   Medication Sig Dispense Refill    aspirin (ECOTRIN) 81 MG EC tablet Take 1 tablet (81 mg total) by mouth once daily. 30 tablet 6    atorvastatin (LIPITOR) 80 MG tablet Take 1 tablet (80 mg total) by mouth once daily. 90 tablet 3    buPROPion (WELLBUTRIN XL) 300 MG 24 hr tablet Take 300 mg by mouth once daily.      clopidogreL (PLAVIX) 75 mg tablet Take 1 tablet (75 mg  "total) by mouth once daily. 30 tablet 11    ergocalciferol (ERGOCALCIFEROL) 50,000 unit Cap Take 50,000 Units by mouth every 7 days.      famotidine (PEPCID) 40 MG tablet Take 40 mg by mouth nightly as needed.      fenofibrate (TRICOR) 54 MG tablet Take 1 tablet by mouth once daily.      gabapentin (NEURONTIN) 600 MG tablet Take 600 mg by mouth once daily.      metoprolol succinate (TOPROL-XL) 50 MG 24 hr tablet Take 1 tablet by mouth once daily.      pantoprazole (PROTONIX) 40 MG tablet Take 40 mg by mouth once daily.      traZODone (DESYREL) 150 MG tablet Take 150 mg by mouth nightly.      verapamiL (CALAN-SR) 240 MG CR tablet Take 240 mg by mouth once daily.       No current facility-administered medications for this visit.       Review of patient's allergies indicates:   Allergen Reactions    Penicillins         Vitals:    06/17/24 0926   BP: (!) 155/88   BP Location: Left arm   Patient Position: Sitting   Pulse: 85   Weight: 102.4 kg (225 lb 12 oz)   Height: 6' 3" (1.905 m)        Review of Systems  12 point ROS performed and negative unless otherwise documented in HPI  Objective:     Neurologic Exam     Mental Status   Oriented to person, place, and time.   Speech: speech is normal   Level of consciousness: alert    Cranial Nerves   Cranial nerves II through XII intact.     Motor Exam   Muscle bulk: normal  Overall muscle tone: normal  Right arm tone: normal  Left arm tone: normal  Right leg tone: normal  Left leg tone: normal    Strength   Strength 5/5 throughout.     Sensory Exam   Light touch normal.     Gait, Coordination, and Reflexes     Gait  Gait: normal      Physical Exam  Vitals reviewed.   Neurological:      Mental Status: He is oriented to person, place, and time.      Cranial Nerves: Cranial nerves 2-12 are intact.      Motor: Motor strength is normal.     Gait: Gait is intact.   Psychiatric:         Speech: Speech normal.          Assessment:     1. Intracranial atherosclerosis    2. TIA " (transient ischemic attack)        Plan:   Ok to start Toprol XR due to headaches with elevated BP.  Advised to continue monitoring BP and present to PCP in a few weeks for BP management.  Would like patient's SBP to remain between 120-160.  Continue ASA/plavix for at least the next year  Will follow up in 3 months with patient. Discussed stenting/angioplasty as a last resort intervention. Advised to call with any increase in weakness.

## 2024-09-02 PROBLEM — G45.9 TIA (TRANSIENT ISCHEMIC ATTACK): Status: RESOLVED | Noted: 2024-06-03 | Resolved: 2024-09-02

## 2024-09-03 ENCOUNTER — OFFICE VISIT (OUTPATIENT)
Dept: NEUROLOGY | Facility: CLINIC | Age: 69
End: 2024-09-03
Payer: MEDICARE

## 2024-09-03 VITALS
SYSTOLIC BLOOD PRESSURE: 142 MMHG | HEART RATE: 70 BPM | WEIGHT: 225.75 LBS | DIASTOLIC BLOOD PRESSURE: 79 MMHG | BODY MASS INDEX: 28.07 KG/M2 | HEIGHT: 75 IN

## 2024-09-03 DIAGNOSIS — I67.2 INTRACRANIAL ATHEROSCLEROSIS: Primary | ICD-10-CM

## 2024-09-03 DIAGNOSIS — G45.9 TRANSIENT CEREBRAL ISCHEMIA, UNSPECIFIED TYPE: ICD-10-CM

## 2024-09-03 PROCEDURE — 99999 PR PBB SHADOW E&M-EST. PATIENT-LVL III: CPT | Mod: PBBFAC,,, | Performed by: NURSE PRACTITIONER

## 2024-09-03 PROCEDURE — 99213 OFFICE O/P EST LOW 20 MIN: CPT | Mod: PBBFAC | Performed by: NURSE PRACTITIONER

## 2024-09-03 PROCEDURE — 99214 OFFICE O/P EST MOD 30 MIN: CPT | Mod: S$PBB,,, | Performed by: NURSE PRACTITIONER

## 2024-09-03 NOTE — PROGRESS NOTES
Subjective:      Patient ID: Deangelo Mcdermott is a 69 y.o. male.    Chief Complaint:  Stroke (3 month follow up Dx: TIA ; Intracranial atherosclerosis. Patient denies any weakness, slurred spoeech, dizziness, headaches, or any stroke like symptoms./)      History of Present Illness  Patient presents for follow up of ICAD. He has a history of HTN, HLD, TIAs. Patient's cerebral angiogram revealed severe intracranial atherosclerosis with multifocal severe stenosis. Patient was placed on DAPT and advised to keep his SBP between 120-160. Patient reports he has only had one episode of leg weakness since his angiogram. Denies any dizziness today. Denies any headaches. Reports he is feeling much better than prior to his angiogram.             Past Medical History:   Diagnosis Date    Essential (primary) hypertension     High cholesterol        Past Surgical History:   Procedure Laterality Date    NASAL SEPTUM SURGERY      TONSILLECTOMY         Family History   Problem Relation Name Age of Onset    Cancer Father      Cancer Sister         Social History     Socioeconomic History    Marital status:    Tobacco Use    Smoking status: Never     Passive exposure: Never    Smokeless tobacco: Never   Substance and Sexual Activity    Alcohol use: Not Currently       Current Outpatient Medications   Medication Sig Dispense Refill    aspirin (ECOTRIN) 81 MG EC tablet Take 1 tablet (81 mg total) by mouth once daily. 30 tablet 6    atorvastatin (LIPITOR) 80 MG tablet Take 1 tablet (80 mg total) by mouth once daily. 90 tablet 3    buPROPion (WELLBUTRIN XL) 300 MG 24 hr tablet Take 300 mg by mouth once daily.      clopidogreL (PLAVIX) 75 mg tablet Take 1 tablet (75 mg total) by mouth once daily. 30 tablet 11    ergocalciferol (ERGOCALCIFEROL) 50,000 unit Cap Take 50,000 Units by mouth every 7 days.      famotidine (PEPCID) 40 MG tablet Take 40 mg by mouth nightly as needed.      fenofibrate (TRICOR) 54 MG tablet Take 1 tablet by mouth  "once daily.      gabapentin (NEURONTIN) 600 MG tablet Take 600 mg by mouth once daily.      metoprolol succinate (TOPROL-XL) 50 MG 24 hr tablet Take 1 tablet by mouth once daily.      pantoprazole (PROTONIX) 40 MG tablet Take 40 mg by mouth once daily.      traZODone (DESYREL) 150 MG tablet Take 150 mg by mouth nightly.      verapamiL (CALAN-SR) 240 MG CR tablet Take 240 mg by mouth once daily.       No current facility-administered medications for this visit.       Review of patient's allergies indicates:   Allergen Reactions    Penicillins       Vitals:    09/03/24 1022   BP: (!) 142/79   BP Location: Left arm   Patient Position: Sitting   Pulse: 70   Weight: 102.4 kg (225 lb 12 oz)   Height: 6' 3" (1.905 m)      Review of Systems  12 point ROS performed and negative unless otherwise documented in HPI  Objective:     Neurologic Exam      Mental Status   Oriented to person, place, and time.   Speech: speech is normal   Level of consciousness: alert     Cranial Nerves   Cranial nerves II through XII intact.      Motor Exam   Muscle bulk: normal  Overall muscle tone: normal  Right arm tone: normal  Left arm tone: normal  Right leg tone: normal  Left leg tone: normal     Strength   Strength 5/5 throughout.      Sensory Exam   Light touch normal.      Gait, Coordination, and Reflexes      Gait  Gait: normal        Physical Exam  Vitals reviewed.   Neurological:      Mental Status: He is oriented to person, place, and time.      Cranial Nerves: Cranial nerves 2-12 are intact.      Motor: Motor strength is normal.     Gait: Gait is intact.   Psychiatric:         Speech: Speech normal.        Assessment:     1. Intracranial atherosclerosis    2. Transient cerebral ischemia, unspecified type        Plan:     Repeat CTA head/neck  Patient reports improvement with -160/ASA/plavix/statin  Will need to continue DAPT x 1 year  Advised to call with any increase in weakness/falls; may need to consider stenting if weakness " persists.

## 2024-10-23 ENCOUNTER — PATIENT MESSAGE (OUTPATIENT)
Dept: RESEARCH | Facility: HOSPITAL | Age: 69
End: 2024-10-23
Payer: MEDICARE

## 2024-12-09 ENCOUNTER — OFFICE VISIT (OUTPATIENT)
Dept: NEUROLOGY | Facility: CLINIC | Age: 69
End: 2024-12-09
Payer: MEDICARE

## 2024-12-09 VITALS
HEIGHT: 75 IN | SYSTOLIC BLOOD PRESSURE: 165 MMHG | WEIGHT: 225.75 LBS | BODY MASS INDEX: 28.07 KG/M2 | DIASTOLIC BLOOD PRESSURE: 90 MMHG | HEART RATE: 80 BPM

## 2024-12-09 DIAGNOSIS — I67.2 INTRACRANIAL ATHEROSCLEROSIS: Primary | ICD-10-CM

## 2024-12-09 PROCEDURE — 99213 OFFICE O/P EST LOW 20 MIN: CPT | Mod: PBBFAC | Performed by: NURSE PRACTITIONER

## 2024-12-09 PROCEDURE — 99213 OFFICE O/P EST LOW 20 MIN: CPT | Mod: S$PBB,,, | Performed by: NURSE PRACTITIONER

## 2024-12-09 PROCEDURE — 99999 PR PBB SHADOW E&M-EST. PATIENT-LVL III: CPT | Mod: PBBFAC,,, | Performed by: NURSE PRACTITIONER

## 2024-12-09 NOTE — PROGRESS NOTES
Cutting bread with a knife, cut tip of left thumb with knife 1/2 hour ago. Last tetanus 5/2012.   Subjective:      Patient ID: Deangelo Mcdermott is a 69 y.o. male.    Chief Complaint:  Follow-up (3 month follow up DX: Intracranial atherosclerosis. Patient states overall he feels good and states he has no concerns. Denies any headaches, dizziness, weakness, or blurred vision. )      History of Present Illness  Patient presents for follow up of ICAD. He has a history of HTN, HLD, TIAs. Patient's cerebral angiogram revealed severe intracranial atherosclerosis with multifocal severe stenosis. Patient was placed on DAPT and advised to keep his SBP between 120-160.   Today, patient denies any new onset of numbness, tingling or weakness. Denies any dizziness. Denies any recurrent leg weakness.           Past Medical History:   Diagnosis Date    Essential (primary) hypertension     High cholesterol        Past Surgical History:   Procedure Laterality Date    NASAL SEPTUM SURGERY      TONSILLECTOMY         Family History   Problem Relation Name Age of Onset    Cancer Father      Cancer Sister         Social History     Socioeconomic History    Marital status:    Tobacco Use    Smoking status: Never     Passive exposure: Never    Smokeless tobacco: Never   Substance and Sexual Activity    Alcohol use: Not Currently       Current Outpatient Medications   Medication Sig Dispense Refill    aspirin (ECOTRIN) 81 MG EC tablet Take 1 tablet (81 mg total) by mouth once daily. 30 tablet 6    atorvastatin (LIPITOR) 80 MG tablet Take 1 tablet (80 mg total) by mouth once daily. 90 tablet 3    buPROPion (WELLBUTRIN XL) 300 MG 24 hr tablet Take 300 mg by mouth once daily.      clopidogreL (PLAVIX) 75 mg tablet Take 1 tablet (75 mg total) by mouth once daily. 30 tablet 11    ergocalciferol (ERGOCALCIFEROL) 50,000 unit Cap Take 50,000 Units by mouth every 7 days.      famotidine (PEPCID) 40 MG tablet Take 40 mg by mouth nightly as needed.      fenofibrate (TRICOR) 54 MG tablet Take 1 tablet by mouth once daily.      gabapentin  "(NEURONTIN) 600 MG tablet Take 600 mg by mouth once daily.      metoprolol succinate (TOPROL-XL) 50 MG 24 hr tablet Take 1 tablet by mouth once daily.      pantoprazole (PROTONIX) 40 MG tablet Take 40 mg by mouth once daily.      traZODone (DESYREL) 150 MG tablet Take 150 mg by mouth nightly.      verapamiL (CALAN-SR) 240 MG CR tablet Take 240 mg by mouth once daily.       No current facility-administered medications for this visit.       Review of patient's allergies indicates:   Allergen Reactions    Penicillins       Vitals:    12/09/24 1058 12/09/24 1112   BP: (!) 174/67 (!) 165/90   BP Location: Left arm    Patient Position: Sitting Sitting   Pulse: 80    Weight: 102.4 kg (225 lb 12 oz)    Height: 6' 3" (1.905 m)       Review of Systems  12 point ROS performed and negative unless otherwise documented in HPI  Objective:     Neurologic Exam      Mental Status   Oriented to person, place, and time.   Speech: speech is normal   Level of consciousness: alert     Cranial Nerves   Cranial nerves II through XII intact.      Motor Exam   Muscle bulk: normal  Overall muscle tone: normal  Right arm tone: normal  Left arm tone: normal  Right leg tone: normal  Left leg tone: normal     Strength   Strength 5/5 throughout.      Sensory Exam   Light touch normal.      Gait, Coordination, and Reflexes      Gait  Gait: normal        Physical Exam  Vitals reviewed.   Neurological:      Mental Status: He is oriented to person, place, and time.      Cranial Nerves: Cranial nerves 2-12 are intact.      Motor: Motor strength is normal.     Gait: Gait is intact.   Psychiatric:         Speech: Speech normal.        Assessment:     1. Intracranial atherosclerosis        Plan:   Continue ASA/plavix/statin for ICAD/secondary stroke prevention  -160  Will need to continue DAPT x 1 year  Advised to call with any increase in weakness/falls; may need to consider stenting if weakness persists.        "

## 2025-03-03 ENCOUNTER — HOSPITAL ENCOUNTER (OUTPATIENT)
Dept: RADIOLOGY | Facility: HOSPITAL | Age: 70
Discharge: HOME | End: 2025-03-03
Attending: NURSE PRACTITIONER
Payer: MEDICARE

## 2025-03-03 DIAGNOSIS — G45.9 TRANSIENT CEREBRAL ISCHEMIA, UNSPECIFIED TYPE: ICD-10-CM

## 2025-03-03 LAB
CREAT SERPL-MCNC: 1.6 MG/DL (ref 0.5–1.4)
SAMPLE: ABNORMAL

## 2025-03-03 PROCEDURE — 70498 CT ANGIOGRAPHY NECK: CPT | Mod: TC

## 2025-03-03 PROCEDURE — 25500020 PHARM REV CODE 255: Performed by: NURSE PRACTITIONER

## 2025-03-03 RX ADMIN — IOHEXOL 100 ML: 350 INJECTION, SOLUTION INTRAVENOUS at 10:03

## 2025-04-21 ENCOUNTER — RESULTS FOLLOW-UP (OUTPATIENT)
Dept: NEUROLOGY | Facility: CLINIC | Age: 70
End: 2025-04-21

## 2025-04-21 ENCOUNTER — TELEPHONE (OUTPATIENT)
Dept: NEUROLOGY | Facility: CLINIC | Age: 70
End: 2025-04-21
Payer: MEDICARE

## 2025-04-21 NOTE — TELEPHONE ENCOUNTER
----- Message from KATERINA Watkins sent at 4/21/2025  8:36 AM CDT -----  Please notify patient that his vessel imaging is stable with no limitations in blood flow to the brain . Continue current medications and keep scheduled follow up.  ----- Message -----  From: Interface, Rad Results In  Sent: 3/3/2025  11:00 AM CDT  To: KATERINA Biggs

## 2025-06-10 ENCOUNTER — OFFICE VISIT (OUTPATIENT)
Dept: NEUROLOGY | Facility: CLINIC | Age: 70
End: 2025-06-10
Payer: MEDICARE

## 2025-06-10 VITALS
WEIGHT: 225.75 LBS | HEART RATE: 67 BPM | HEIGHT: 75 IN | SYSTOLIC BLOOD PRESSURE: 138 MMHG | DIASTOLIC BLOOD PRESSURE: 82 MMHG | BODY MASS INDEX: 28.07 KG/M2

## 2025-06-10 DIAGNOSIS — I67.2 INTRACRANIAL ATHEROSCLEROSIS: Primary | ICD-10-CM

## 2025-06-10 DIAGNOSIS — G45.9 TIA (TRANSIENT ISCHEMIC ATTACK): ICD-10-CM

## 2025-06-10 PROCEDURE — 99213 OFFICE O/P EST LOW 20 MIN: CPT | Mod: S$PBB,,, | Performed by: NURSE PRACTITIONER

## 2025-06-10 PROCEDURE — 99999 PR PBB SHADOW E&M-EST. PATIENT-LVL III: CPT | Mod: PBBFAC,,, | Performed by: NURSE PRACTITIONER

## 2025-06-10 PROCEDURE — 99213 OFFICE O/P EST LOW 20 MIN: CPT | Mod: PBBFAC | Performed by: NURSE PRACTITIONER

## 2025-06-10 NOTE — PROGRESS NOTES
"Subjective:      Patient ID: Deangelo Mcdermott is a 69 y.o. male.    Chief Complaint:  Follow-up (6 mnth follow up DX: Intracranial artherosclerosis. Patient states he has no concerns and denies any headaches, dizziness, weakness, or slurred speech. /)      History of Present Illness  Patient presents for follow up of ICAD. He has a history of HTN, HLD, TIAs. Patient's cerebral angiogram revealed severe intracranial atherosclerosis with multifocal severe stenosis. Patient was placed on DAPT and advised to keep his SBP between 120-160.   Today, patient denies any new onset of numbness, tingling or weakness. Denies any dizziness. Denies any recurrent leg weakness. He had a CTA head/neck in April 2025 that showed no LVO. Stable imaging. He reports he is feeling well. Denies any bleeding tendencies on DAPT.            Past Medical History:   Diagnosis Date    Essential (primary) hypertension     High cholesterol        Past Surgical History:   Procedure Laterality Date    NASAL SEPTUM SURGERY      TONSILLECTOMY         Family History   Problem Relation Name Age of Onset    Cancer Father      Cancer Sister         Social History     Socioeconomic History    Marital status:    Tobacco Use    Smoking status: Never     Passive exposure: Never    Smokeless tobacco: Never   Substance and Sexual Activity    Alcohol use: Not Currently       Current Medications[1]    Review of patient's allergies indicates:   Allergen Reactions    Penicillins         Vitals:    06/10/25 0954   BP: 138/82   BP Location: Left arm   Patient Position: Sitting   Pulse: 67   Weight: 102.4 kg (225 lb 12 oz)   Height: 6' 3" (1.905 m)      Review of Systems  12 point ROS performed and negative unless otherwise documented in HPI  Objective:     Neurologic Exam      Mental Status   Oriented to person, place, and time.   Speech: speech is normal   Level of consciousness: alert     Cranial Nerves   Cranial nerves II through XII intact.      Motor Exam "   Muscle bulk: normal  Overall muscle tone: normal  Right arm tone: normal  Left arm tone: normal  Right leg tone: normal  Left leg tone: normal     Strength   Strength 5/5 throughout.      Sensory Exam   Light touch normal.      Gait, Coordination, and Reflexes      Gait  Gait: normal        Physical Exam  Vitals reviewed.   Neurological:      Mental Status: He is oriented to person, place, and time.      Cranial Nerves: Cranial nerves 2-12 are intact.      Motor: Motor strength is normal.     Gait: Gait is intact.   Psychiatric:         Speech: Speech normal.        Assessment:     1. Intracranial atherosclerosis    2. TIA (transient ischemic attack)         Plan:     Continue ASA/plavix/statin for ICAD/secondary stroke prevention(can discontinue Plavix in December 2025)  -160  Will need to continue DAPT x 1 year  Advised to call with any increase in weakness/falls; may need to consider stenting if weakness persists.             [1]   Current Outpatient Medications   Medication Sig Dispense Refill    aspirin (ECOTRIN) 81 MG EC tablet Take 1 tablet (81 mg total) by mouth once daily. 30 tablet 6    atorvastatin (LIPITOR) 80 MG tablet Take 1 tablet (80 mg total) by mouth once daily. 90 tablet 3    buPROPion (WELLBUTRIN XL) 300 MG 24 hr tablet Take 300 mg by mouth once daily.      clopidogreL (PLAVIX) 75 mg tablet Take 1 tablet (75 mg total) by mouth once daily. 30 tablet 11    ergocalciferol (ERGOCALCIFEROL) 50,000 unit Cap Take 50,000 Units by mouth every 7 days.      famotidine (PEPCID) 40 MG tablet Take 40 mg by mouth nightly as needed.      fenofibrate (TRICOR) 54 MG tablet Take 1 tablet by mouth once daily.      gabapentin (NEURONTIN) 600 MG tablet Take 600 mg by mouth once daily.      metoprolol succinate (TOPROL-XL) 50 MG 24 hr tablet Take 1 tablet by mouth once daily.      pantoprazole (PROTONIX) 40 MG tablet Take 40 mg by mouth once daily.      traZODone (DESYREL) 150 MG tablet Take 150 mg by mouth  nightly.       No current facility-administered medications for this visit.